# Patient Record
(demographics unavailable — no encounter records)

---

## 2024-10-30 NOTE — ASSESSMENT
[FreeTextEntry1] : 9 yo F with ASD and ADHD seen for first time seizure in the context of fever in July 2024. 1h EEG 7/2024 with mild generalized slowing. 24h VEEG with bi T-P epileptiform activity. [facility] epilepsy panel neg. MRI brain wnl.   Pt follows with Developmental Peds here and has neg X fragile and microarray. Brother also with ASD. No Fhx seizures. Has appt with Genetics in Dec  Discussed she has predisposition for seizures but given provoked seizure with fever, parents want to think regarding AS meds

## 2024-10-30 NOTE — HISTORY OF PRESENT ILLNESS
[FreeTextEntry1] : 9 yo F with ASD and ADHD seen for first time seizure in the context of fever in 2024. 1h EEG 2024 with mild generalized slowing. 24h VEEG with bi T-P epileptiform activity. [facility] epilepsy panel neg. MRI brain wnl.  Pt follows with Developmental Peds here and has neg X fragile and microarray. Brother also with ASD. No Fhx seizures      HPI Objective Statement: Patient is an 8-year-old female past medical history of autism who presents to the ED for concern for seizure episode.  Mother reports patient is at her baseline state of health until yesterday when she developed a fever Tmax 1038 at home.  Parents were getting ready to put the patient in a cool bath when she was sitting up and all of a sudden they noticed her eyes rolling back, her eyes began blinking fast and tongue was sticking out.  They became concerned and called EMS. Parents estimate that this episode lasted about 5 minutes before resolving on its own.  No tonic clonic movements, no incontinence, no tongue biting. Parents report afterwards she was more tired and not responsive. Parents deny any other sx such as cough, congestion, sore throat, headache, N/V/D.  Patient was initially taken to [facility] where patient had a viral swab done patient was given antipyretics and came back to baseline prior to being discharged home.  Since being home patient has been doing better eating and drinking normally, no further seizure like episodes however patient had another temperature this morning of 104 around 1130 which mother gave motrin for.  Parents came here as they were told by the [facility] ER doctor that they may need a video EEG to rule out seizure. No hx of seizures.  PMHx Normal , FT DD, dx with ASD at 3 yo by EI. Got ST, PT, OT, SEIT.  Also got TOPHER PS, special ed class 8:1:2 and 1 to 1 aid. Gets PT, OT and ST.  Follows with Developmental Peds  Microarray and X fragile neg  FHx 7 yo Brother with ASD ex 32 weeker  INTERVAL HX - VEEg - Staring episodes with no electrographic correlate. Very abundant sleep potentiated independent bilateral temporoparietal epileptiform activity. - Saffron recommended for ADHD. No meds.

## 2024-10-30 NOTE — REASON FOR VISIT
[Autism] : Autism [Seizure] : seizure [Mother] : mother [Follow-Up Evaluation] : a follow-up evaluation for

## 2024-10-30 NOTE — DEVELOPMENTAL MILESTONES
[Washes and dries hands] : washes and dries hands  [Brushes teeth with help] : brushes teeth with help [Plays with other children] : plays with other children [Imitates vertical line] : imitates vertical line [Turns pages of book 1 at a time] : turns pages of book 1 at a time [Jumps up] : jumps up [Kicks ball] : kicks ball [Walks up and down stairs 1 step at a time] : walks up and down stairs 1 step at a time [Speech half understanable] : speech half understandable [Body parts - 6] : body parts - 6 [Says >20 words] : says >20 words [Combines words] : combines words [Feeds self with help] : feeds self with help [Wash and dry hand] : wash and dry hand  [Brushes teeth, no help] : brushes teeth, no help [Day toilet trained for bowel and bladder] : day toilet trained for bowel and bladder [Plays board/card games] : plays board/card games [Names friend] : names friend [Copies [place]] : copies [place] [Draws person with 2 body parts] : draws person with 2 body parts [Thumb wiggle] : thumb wiggle  [Copies vertical line] : copies vertical line  [2-3 sentences] : 2-3 sentences [Understandable speech 75% of time] : understandable speech 75% of time [Identifies self as girl/boy] : identifies self as girl/boy [Understands 4 prepositions] : understands 4 prepositions  [Names a friend] : names a friend [Throws ball overhead] : throws ball overhead [Walks up stairs alternating feet] : walks up stairs alternating feet [Balances on each foot 3 seconds] : balances on each foot 3 seconds [Broad jump] : broad jump [Puts on clothing] : does not put  on clothing [Plays pretend] : does not play pretend [Follows 2 step command] : does not follow 2 step command  [Dresses self with help] : does not dress self with help [Puts on T-shirt] : does not put on t-shirt [Imaginative play] : no imaginative play [Knows 4 actions] : does not  know 4 actions [Knows 4 pictures] : does not  know 4 pictures [Knows 2 adjectives] : does not know 2 adjectives

## 2024-10-30 NOTE — DISCUSSION/SUMMARY
[FreeTextEntry1] : Parents called that Kristyn keeps having staring episodes.  rEEG 1h slow MRI brain normal [facility] genetic testing neg  Mom feels she is not going to tolerate AEEG and would like to do a VEEG in the hosp in order to capture events Will task MIREYA Thompson

## 2024-10-30 NOTE — PLAN
[FreeTextEntry1] : [] F/U Genetics in Dec [] Parents want to think about possibility to start AS meds

## 2024-10-30 NOTE — PHYSICAL EXAM
[Well-appearing] : well-appearing [Normocephalic] : normocephalic [No dysmorphic facial features] : no dysmorphic facial features [No ocular abnormalities] : no ocular abnormalities [Neck supple] : neck supple [No deformities] : no deformities [Alert] : alert [Normal speech and language] : normal speech and language [Pupils reactive to light and accommodation] : pupils reactive to light and accommodation [Full extraocular movements] : full extraocular movements [Saccadic and smooth pursuits intact] : saccadic and smooth pursuits intact [Normal facial sensation to light touch] : normal facial sensation to light touch [No facial asymmetry or weakness] : no facial asymmetry or weakness [Gross hearing intact] : gross hearing intact [Equal palate elevation] : equal palate elevation [Good shoulder shrug] : good shoulder shrug [Normal tongue movement] : normal tongue movement [Midline tongue, no fasciculations] : midline tongue, no fasciculations [Normal axial and appendicular muscle tone] : normal axial and appendicular muscle tone [No abnormal involuntary movements] : no abnormal involuntary movements [Walks and runs well] : walks and runs well [Able to do deep knee bend] : able to do deep knee bend [2+ biceps] : 2+ biceps [Knee jerks] : knee jerks [No ankle clonus] : no ankle clonus [Localizes LT and temperature] : localizes LT and temperature [Good walking balance] : good walking balance [Normal gait] : normal gait [de-identified] : no distress [de-identified] : inconsistent eye contact [de-identified] : follows some simple commands [de-identified] : power seems full [de-identified] : no gross dysmetria

## 2024-12-18 NOTE — REASON FOR VISIT
[Initial - Scheduled] : [unfilled]  is being seen for  ~M an initial scheduled visit [Mother] : mother [Medical Records] : medical records Detail Level: Detailed

## 2024-12-24 NOTE — BIRTH HISTORY
[FreeTextEntry1] : Kristyn was the 7 pound 8 ounce product of a full term uncomplicated pregnancy born via  at [facility]. She was in the NICU due to aspiration of fluid but otherwise did well and passed all  screening.

## 2024-12-24 NOTE — END OF VISIT
[Time Spent: ___ minutes] : I have spent [unfilled] minutes of time on the encounter which excludes teaching and separately reported services.
[Time Spent: ___ minutes] : I have spent [unfilled] minutes of time on the encounter which excludes teaching and separately reported services.
Provider pre-printed instructions given

## 2024-12-24 NOTE — HISTORY OF PRESENT ILLNESS
[de-identified] : Kristyn is an 8 year old female with global developmental delays, intellectual disability, autism, ADHD, and recent febrile seizure in July 2024. She had a 1 hour EEG and a VEEG, both consistent with seizure activity. MRI of the brain was normal. Kristyn walked at 15 months. She had her first non-purposeful word at 9 months. She had >10 words until age 15 months but then stopped speaking. She was evaluated by Early Intervention at <2 year of age and was diagnosed with diagnosed with autism. She started receiving TOPHER, ST, OT and PT. She did not speak again until she was 6 years old. She now speaks in mostly single words or in 2 word phrases. She is not responsive to verbal cues. She is able to identify animals and mimic the animal sounds. Her receptive language is stronger than her expressive language. She does not have a sense of danger. She toilet trained at ~6 years old. She can feed and dress themselves. She is in a self-contained 12:1:1 special education class and is receiving OT, PT and SLT. She uses a communication device.    She has had negative fragile X testing, negative CMA and negative [facility] Epilepsy panel.

## 2024-12-24 NOTE — BIRTH HISTORY
[FreeTextEntry1] : Kristyn was the 7 pound 8 ounce product of a full term uncomplicated pregnancy born via  at [facility]. She was in the NICU due to aspiration of fluid but otherwise did well and passed all  screening.

## 2024-12-24 NOTE — PHYSICAL EXAM
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

## 2024-12-24 NOTE — PHYSICAL EXAM
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

## 2024-12-24 NOTE — PLAN
[TextEntry] : 1. Quad LUCINDA with sister, Kristyn will be proband 2. Discussed mom's cancer predisposition testing - she will find the results to determine if any further testing will be recommended 3. Follow-up pending results.

## 2024-12-24 NOTE — FAMILY HISTORY
[FreeTextEntry1] : Kristyn is the child of a non-consanguineous couple of [place] descent. She has an older sister with autism, ADHD, and history of one febrile seizure. Her mother was diagnosed with breast cancer at age 37.

## 2024-12-24 NOTE — HISTORY OF PRESENT ILLNESS
[de-identified] : Kristyn is an 8 year old female with global developmental delays, intellectual disability, autism, ADHD, and recent febrile seizure in July 2024. She had a 1 hour EEG and a VEEG, both consistent with seizure activity. MRI of the brain was normal. Kristyn walked at 15 months. She had her first non-purposeful word at 9 months. She had >10 words until age 15 months but then stopped speaking. She was evaluated by Early Intervention at <2 year of age and was diagnosed with diagnosed with autism. She started receiving TOPHER, ST, OT and PT. She did not speak again until she was 6 years old. She now speaks in mostly single words or in 2 word phrases. She is not responsive to verbal cues. She is able to identify animals and mimic the animal sounds. Her receptive language is stronger than her expressive language. She does not have a sense of danger. She toilet trained at ~6 years old. She can feed and dress themselves. She is in a self-contained 12:1:1 special education class and is receiving OT, PT and SLT. She uses a communication device.    She has had negative fragile X testing, negative CMA and negative [facility] Epilepsy panel.

## 2025-01-23 NOTE — DEVELOPMENTAL MILESTONES
[Washes and dries hands] : washes and dries hands  [Brushes teeth with help] : brushes teeth with help [Plays with other children] : plays with other children [Imitates vertical line] : imitates vertical line [Turns pages of book 1 at a time] : turns pages of book 1 at a time [Jumps up] : jumps up [Kicks ball] : kicks ball [Walks up and down stairs 1 step at a time] : walks up and down stairs 1 step at a time [Speech half understanable] : speech half understandable [Body parts - 6] : body parts - 6 [Says >20 words] : says >20 words [Combines words] : combines words [Feeds self with help] : feeds self with help [Wash and dry hand] : wash and dry hand  [Brushes teeth, no help] : brushes teeth, no help [Day toilet trained for bowel and bladder] : day toilet trained for bowel and bladder [Plays board/card games] : plays board/card games [Names friend] : names friend [Copies [place]] : copies [place] [Draws person with 2 body parts] : draws person with 2 body parts [Thumb wiggle] : thumb wiggle  [Copies vertical line] : copies vertical line  [2-3 sentences] : 2-3 sentences [Understandable speech 75% of time] : understandable speech 75% of time [Identifies self as girl/boy] : identifies self as girl/boy [Understands 4 prepositions] : understands 4 prepositions  [Names a friend] : names a friend [Throws ball overhead] : throws ball overhead [Walks up stairs alternating feet] : walks up stairs alternating feet [Balances on each foot 3 seconds] : balances on each foot 3 seconds [Broad jump] : broad jump [Puts on clothing] : does not put  on clothing [Plays pretend] : does not play pretend [Follows 2 step command] : does not follow 2 step command  [Dresses self with help] : does not dress self with help [Puts on T-shirt] : does not put on t-shirt [Imaginative play] : no imaginative play [Knows 4 actions] : does not  know 4 actions [Knows 4 pictures] : does not  know 4 pictures [Knows 2 adjectives] : does not know 2 adjectives

## 2025-01-23 NOTE — REASON FOR VISIT
[Follow-Up Evaluation] : a follow-up evaluation for [Autism] : Autism [Seizure] : seizure [Mother] : mother

## 2025-01-23 NOTE — ASSESSMENT
[FreeTextEntry1] : 9 yo F with ASD and ADHD seen for first time seizure in the context of fever in July 2024. 1h EEG 7/2024 with mild generalized slowing. 24h VEEG with bi T-P epileptiform activity. [facility] epilepsy panel neg. MRI brain wnl.   Pt follows with Developmental Peds here and has neg X fragile and microarray. Brother also with ASD. No Fhx seizures. Saw Genetics in Dec- they did LUCINDA in her, sister, parents- results pending.   Teachers concerned for 2 prolonged episodes yesterday and today at school.  Mom would like to repeat VEEG before considering medication

## 2025-01-23 NOTE — PHYSICAL EXAM
[Well-appearing] : well-appearing [Normocephalic] : normocephalic [No dysmorphic facial features] : no dysmorphic facial features [No ocular abnormalities] : no ocular abnormalities [Neck supple] : neck supple [No deformities] : no deformities [Alert] : alert [Normal speech and language] : normal speech and language [Pupils reactive to light and accommodation] : pupils reactive to light and accommodation [Full extraocular movements] : full extraocular movements [Saccadic and smooth pursuits intact] : saccadic and smooth pursuits intact [Normal facial sensation to light touch] : normal facial sensation to light touch [No facial asymmetry or weakness] : no facial asymmetry or weakness [Gross hearing intact] : gross hearing intact [Equal palate elevation] : equal palate elevation [Good shoulder shrug] : good shoulder shrug [Normal tongue movement] : normal tongue movement [Midline tongue, no fasciculations] : midline tongue, no fasciculations [Normal axial and appendicular muscle tone] : normal axial and appendicular muscle tone [No abnormal involuntary movements] : no abnormal involuntary movements [Walks and runs well] : walks and runs well [Able to do deep knee bend] : able to do deep knee bend [2+ biceps] : 2+ biceps [Knee jerks] : knee jerks [No ankle clonus] : no ankle clonus [Localizes LT and temperature] : localizes LT and temperature [Good walking balance] : good walking balance [Normal gait] : normal gait [de-identified] : no distress [de-identified] : inconsistent eye contact [de-identified] : follows some simple commands [de-identified] : power seems full [de-identified] : no gross dysmetria

## 2025-01-23 NOTE — HISTORY OF PRESENT ILLNESS
[FreeTextEntry1] : 9 yo F with ASD and ADHD seen for first time seizure in the context of fever in 2024. 1h EEG 2024 with mild generalized slowing. 24h VEEG with bi T-P epileptiform activity. [facility] epilepsy panel neg. MRI brain wnl.  Pt follows with Developmental Peds here and has neg X fragile and microarray. Brother also with ASD. LUCINDA by Genetics sent and pending No Fhx seizures      HPI Objective Statement: Patient is an 8-year-old female past medical history of autism who presents to the ED for concern for seizure episode.  Mother reports patient is at her baseline state of health until yesterday when she developed a fever Tmax 1038 at home.  Parents were getting ready to put the patient in a cool bath when she was sitting up and all of a sudden they noticed her eyes rolling back, her eyes began blinking fast and tongue was sticking out.  They became concerned and called EMS. Parents estimate that this episode lasted about 5 minutes before resolving on its own.  No tonic clonic movements, no incontinence, no tongue biting. Parents report afterwards she was more tired and not responsive. Parents deny any other sx such as cough, congestion, sore throat, headache, N/V/D.  Patient was initially taken to [facility] where patient had a viral swab done patient was given antipyretics and came back to baseline prior to being discharged home.  Since being home patient has been doing better eating and drinking normally, no further seizure like episodes however patient had another temperature this morning of 104 around 1130 which mother gave motrin for.  Parents came here as they were told by the [facility] ER doctor that they may need a video EEG to rule out seizure. No hx of seizures.  PMHx Normal , FT DD, dx with ASD at 1 yo by EI. Got ST, PT, OT, SEIT.  Also got TOPHER PS, special ed class 8:1:2 and 1 to 1 aid. Gets PT, OT and ST.  Follows with Developmental Peds  Microarray and X fragile neg LUCINDA x 4 pending  FHx 7 yo Brother with ASD ex 32 weeker  INTERVAL HX - VEEg - Staring episodes with no electrographic correlate. Very abundant sleep potentiated independent bilateral temporoparietal epileptiform activity. - Saffron recommended for ADHD. No meds.  - Teacher called mom today and yesterday for prolonged staring episode. Mom wants to repeat VEEG before thinking about medication